# Patient Record
Sex: MALE | Race: WHITE | NOT HISPANIC OR LATINO | URBAN - NONMETROPOLITAN AREA
[De-identification: names, ages, dates, MRNs, and addresses within clinical notes are randomized per-mention and may not be internally consistent; named-entity substitution may affect disease eponyms.]

---

## 2017-12-06 ENCOUNTER — TELEPHONE (OUTPATIENT)
Dept: URGENT CARE | Facility: CLINIC | Age: 37
End: 2017-12-06

## 2017-12-06 NOTE — TELEPHONE ENCOUNTER
----- Message from Chioma Emery MD sent at 12/5/2017  8:05 AM EST -----  Correction: Patient lists allergy to amoxicillin but was given rocephin at office visit; has he taken keflex or ceftin before without difficulty?  Also, if rash is resolved, he does not need additional antibiotics---will not send additional antibiotics until I hear back answer

## 2017-12-18 ENCOUNTER — HOSPITAL ENCOUNTER (EMERGENCY)
Facility: HOSPITAL | Age: 37
Discharge: HOME | End: 2017-12-18
Attending: EMERGENCY MEDICINE | Admitting: EMERGENCY MEDICINE

## 2017-12-18 VITALS
SYSTOLIC BLOOD PRESSURE: 151 MMHG | OXYGEN SATURATION: 98 % | BODY MASS INDEX: 25.77 KG/M2 | TEMPERATURE: 98.2 F | RESPIRATION RATE: 18 BRPM | WEIGHT: 180 LBS | HEART RATE: 117 BPM | DIASTOLIC BLOOD PRESSURE: 110 MMHG | HEIGHT: 70 IN

## 2017-12-18 DIAGNOSIS — J06.9 ACUTE URI: ICD-10-CM

## 2017-12-18 DIAGNOSIS — I10 HYPERTENSION, UNSPECIFIED TYPE: Primary | ICD-10-CM

## 2017-12-18 LAB
FLUAV AG NPH QL: NEGATIVE
FLUBV AG NPH QL IA: NEGATIVE
S PYO AG THROAT QL: NEGATIVE

## 2017-12-18 PROCEDURE — 87880 STREP A ASSAY W/OPTIC: CPT | Performed by: PHYSICIAN ASSISTANT

## 2017-12-18 PROCEDURE — 99283 EMERGENCY DEPT VISIT LOW MDM: CPT

## 2017-12-18 PROCEDURE — 87804 INFLUENZA ASSAY W/OPTIC: CPT | Performed by: PHYSICIAN ASSISTANT

## 2017-12-18 PROCEDURE — 87081 CULTURE SCREEN ONLY: CPT | Performed by: PHYSICIAN ASSISTANT

## 2017-12-18 RX ORDER — GUAIFENESIN 200 MG/10ML
200 LIQUID ORAL EVERY 4 HOURS PRN
Qty: 240 ML | Refills: 0 | OUTPATIENT
Start: 2017-12-18 | End: 2020-01-28 | Stop reason: HOSPADM

## 2017-12-18 RX ORDER — IBUPROFEN 800 MG/1
800 TABLET ORAL
Qty: 90 TABLET | Refills: 0 | OUTPATIENT
Start: 2017-12-18 | End: 2020-01-28 | Stop reason: HOSPADM

## 2017-12-18 NOTE — ED PROVIDER NOTES
Subjective   Patient is a 37 y.o. male presenting with URI.   History provided by:  Patient   used: No    URI   Presenting symptoms: congestion, cough, fatigue, fever, rhinorrhea and sore throat    Severity:  Moderate  Onset quality:  Sudden  Duration:  2 days  Timing:  Constant  Progression:  Worsening  Chronicity:  New  Relieved by:  Nothing  Worsened by:  Nothing  Associated symptoms: no arthralgias, no headaches, no myalgias and no neck pain    Risk factors: not elderly, no chronic cardiac disease, no immunosuppression, no recent illness and no recent travel        Review of Systems   Constitutional: Positive for fatigue and fever.   HENT: Positive for congestion, rhinorrhea and sore throat.    Respiratory: Positive for cough.    Musculoskeletal: Negative for arthralgias, myalgias and neck pain.   Neurological: Negative for headaches.   All other systems reviewed and are negative.      Past Medical History:   Diagnosis Date   • Hypertension    • Low testosterone        Allergies   Allergen Reactions   • Amoxicillin Rash       Past Surgical History:   Procedure Laterality Date   • TONSILLECTOMY         History reviewed. No pertinent family history.    Social History     Social History   • Marital status: Single     Spouse name: N/A   • Number of children: N/A   • Years of education: N/A     Social History Main Topics   • Smoking status: Never Smoker   • Smokeless tobacco: Current User     Types: Snuff   • Alcohol use None   • Drug use: None   • Sexual activity: Not Asked     Other Topics Concern   • None     Social History Narrative   • None           Objective   Physical Exam   Constitutional: He is oriented to person, place, and time. He appears well-developed and well-nourished.   HENT:   Head: Normocephalic and atraumatic.   Nose: Rhinorrhea present. No sinus tenderness or nasal deformity.   Mouth/Throat: Oropharynx is clear and moist. No oropharyngeal exudate.   Eyes: Conjunctivae and EOM  are normal. Pupils are equal, round, and reactive to light. Right eye exhibits no discharge. Left eye exhibits no discharge. No scleral icterus.   Neck: Normal range of motion. Neck supple.   Cardiovascular: Normal rate, regular rhythm and normal heart sounds.    Pulmonary/Chest: Effort normal and breath sounds normal.   Abdominal: Soft. Bowel sounds are normal.   Musculoskeletal: Normal range of motion.   Neurological: He is alert and oriented to person, place, and time. He has normal reflexes.   Skin: Skin is warm and dry.   Psychiatric: He has a normal mood and affect. His behavior is normal. Judgment and thought content normal.   Nursing note and vitals reviewed.      Procedures         ED Course  ED Course   Comment By Time   Discussed care with patient. Patient will be discharged treated for viral uri. Patient is stable at this time. Did discuss need for lab and possible fluids. Patient refused at this time. Patient did have elevated BP. Patient advised to take medication as scheduled. Alfred Hurtado PA-C 12/18 1649                  MDM  Number of Diagnoses or Management Options  Acute URI: new and requires workup  Hypertension, unspecified type: new and requires workup     Amount and/or Complexity of Data Reviewed  Clinical lab tests: reviewed    Risk of Complications, Morbidity, and/or Mortality  Presenting problems: moderate  Diagnostic procedures: moderate  Management options: moderate    Patient Progress  Patient progress: stable      Final diagnoses:   Hypertension, unspecified type   Acute URI            Alfred Hurtado PA-C  12/18/17 1650

## 2017-12-20 LAB — BACTERIA SPEC AEROBE CULT: NORMAL
